# Patient Record
Sex: MALE | Race: WHITE | Employment: FULL TIME | URBAN - METROPOLITAN AREA
[De-identification: names, ages, dates, MRNs, and addresses within clinical notes are randomized per-mention and may not be internally consistent; named-entity substitution may affect disease eponyms.]

---

## 2023-07-05 ENCOUNTER — OFFICE VISIT (OUTPATIENT)
Dept: UROLOGY | Age: 33
End: 2023-07-05
Payer: COMMERCIAL

## 2023-07-05 DIAGNOSIS — L72.0 EPIDERMAL CYST: Primary | ICD-10-CM

## 2023-07-05 LAB
BILIRUBIN, URINE, POC: NEGATIVE
BLOOD URINE, POC: NEGATIVE
GLUCOSE URINE, POC: NEGATIVE
KETONES, URINE, POC: NEGATIVE
LEUKOCYTE ESTERASE, URINE, POC: NEGATIVE
NITRITE, URINE, POC: NEGATIVE
PH, URINE, POC: 5 (ref 4.6–8)
PROTEIN,URINE, POC: NEGATIVE
SPECIFIC GRAVITY, URINE, POC: 1.03 (ref 1–1.03)
URINALYSIS CLARITY, POC: NORMAL
URINALYSIS COLOR, POC: NORMAL
UROBILINOGEN, POC: NORMAL

## 2023-07-05 PROCEDURE — 81003 URINALYSIS AUTO W/O SCOPE: CPT | Performed by: UROLOGY

## 2023-07-05 PROCEDURE — 99203 OFFICE O/P NEW LOW 30 MIN: CPT | Performed by: UROLOGY

## 2023-07-05 ASSESSMENT — ENCOUNTER SYMPTOMS
SHORTNESS OF BREATH: 0
ABDOMINAL PAIN: 0
COUGH: 0
DIARRHEA: 0
VOMITING: 0
NAUSEA: 0
EYE PAIN: 0
EYE DISCHARGE: 0
HEARTBURN: 1
BLOOD IN STOOL: 0
BACK PAIN: 0
SKIN LESIONS: 0
CONSTIPATION: 0
INDIGESTION: 1
WHEEZING: 0

## 2023-07-05 NOTE — PROGRESS NOTES
[facility]  [address] [facility] [address]  [id number] [facility]  : 1990    Chief Complaint   Patient presents with    New Patient        HPI     Inga Max is a 28 y.o. male Referred by Dr. Jose Alberto Darling for evaluation and treatment of epididymal cyst.  Pt notes intermittent left testicular pain beginning in . He felt a bump in the left testis. Pain is mild, about 3 or 4 out of 10. Quality is dull. No associated factors. Exam by Dr. Marybeth Quispe in  showed fullness in the left testicular area. Scrotal US 23: Findings: The right testicle is of normal echogenicity and measures 2.9 x 4.5 x 1.9 cm. No intratesticular masses are identified. Arterial and venous blood flow are documented in the right testicle at color Doppler and spectral Doppler evaluation. The right   epididymis is normal.     The left testicle is of normal echogenicity and measures 3.6 x 4.5 x 1.8 cm. No intratesticular masses are identified. Arterial and venous blood flow are documented in the left testicle at color Doppler and spectral Doppler evaluation. The left   epididymis demonstrates a 4 mm cyst.     Negative for hydrocele or varicocele. No abdominal or flank pain, no hematuria. No past medical history on file. No past surgical history on file. No current outpatient medications on file. No current facility-administered medications for this visit.      No Known Allergies  Social History     Socioeconomic History    Marital status: Single     Spouse name: Not on file    Number of children: Not on file    Years of education: Not on file    Highest education level: Not on file   Occupational History    Not on file   Tobacco Use    Smoking status: Never    Smokeless tobacco: Never   Substance and Sexual Activity    Alcohol use: Not on file    Drug use: Not on file    Sexual activity: Not on file   Other Topics Concern    Not on file   Social History Narrative    Not on file     Social